# Patient Record
Sex: FEMALE | Race: WHITE
[De-identification: names, ages, dates, MRNs, and addresses within clinical notes are randomized per-mention and may not be internally consistent; named-entity substitution may affect disease eponyms.]

---

## 2019-10-31 ENCOUNTER — HOSPITAL ENCOUNTER (EMERGENCY)
Dept: HOSPITAL 74 - JERFT | Age: 34
Discharge: HOME | End: 2019-10-31
Payer: COMMERCIAL

## 2019-10-31 VITALS — BODY MASS INDEX: 47.3 KG/M2

## 2019-10-31 VITALS — HEART RATE: 108 BPM | DIASTOLIC BLOOD PRESSURE: 77 MMHG | TEMPERATURE: 98.4 F | SYSTOLIC BLOOD PRESSURE: 122 MMHG

## 2019-10-31 DIAGNOSIS — J02.0: Primary | ICD-10-CM

## 2019-10-31 DIAGNOSIS — B95.0: ICD-10-CM

## 2019-10-31 NOTE — PDOC
History of Present Illness





- General


Chief Complaint: Sore Throat


Stated Complaint: SORE THROAT


Time Seen by Provider: 10/31/19 10:19





- History of Present Illness


Initial Comments: 





10/31/19 10:45


CHIEF COMPLAINT: sore throat





HISTORY OF PRESENT ILLNESS: 35 yo F presents to ED with sore throat x 2 days.  

Patient reports that her daughter was diagnosed and treated with strep a few 

days prior to her onset of symptoms so she is concerned that she has strep as 

well.  Denies any change in voice or difficulty swallowing own saliva.  Reports 

subjective fever, denies vomiting or diarrhea. 





No recent travel or sick contacts. 





PAST MEDICAL HISTORY: Denies past medical history





FAMILY HISTORY: Denies





SOCIAL HISTORY: Denies tobacco, alcohol, illicit drug use. 





SURGICAL HISTORY: Denies





ALLERGIES: No known drug allergies





REVIEW OF SYSTEMS


General/Constitutional: Denies fever or chills. Denies weakness, weight change.





HEENT: Sore throat x 2 days. Denies change in vision. Denies ear pain or 

discharge.


Cardiovascular: Denies chest pain or shortness of breath.





Respiratory: Denies cough, wheezing, or hemoptysis.





Gastrointestinal: Denies nausea, vomiting, diarrhea or constipation. Denies 

rectal bleeding.





Genitourinary: Denies dysuria, frequency, or change in urination.





Musculoskeletal: Denies joint or muscle swelling or pain. Denies neck or back 

pain.





Skin and breasts: Denies rash or easy bruising.





Neurologic: Denies headache, vertigo, loss of consciousness, or loss of 

sensation.





Psychiatric: Denies depression or anxiety.











PHYSICAL EXAM


General Appearance: Well-appearing, appropriately dressed.  No apparent distress

, no intoxication.





HEENT: Exudate to L tonsil. Tonsils 1+ b/l.  No peritonsillar abscess 

appreciated.  EOMI, PERRLA, normal voice, TMs normal, pharynx normal.  No 

conjunctival pallor.  No photophobia, scleral icterus.





Neck: Supple.  Trachea midline. No tenderness, rigidity, carotid bruit, stridor

, lymphadenopathy, or thyromegaly. 





Respiratory/Chest: Lungs CTAB.  No shortness of breath, chest tenderness, 

respiratory distress, accessory muscle use. No crackles, rales, rhonchi, stridor

, wheezing, dullness





Cardiovascular: RRR. S1, S2.  No JVD, murmur, bradycardia, tachycardia.





Vascular Pulses: Dorsalis-Pedis (R): 2+, Dorsalis-Pedis (L): 2+





Gastrointestinal/Abdominal: Normal bowel sounds.  Abdomen soft, non-distended.  

No tenderness or rebound tenderness. No  organomegaly, pulsatile mass, guarding

, hernia, hepatomegaly, splenomegaly.





Lymphatic: No adenopathy, tenderness.





Musculoskeletal/Extremities:  Normal inspection. FROM of all extremities, 

normal capillary refill.  Pelvis Stable.  No CVA tenderness. No tenderness to 

extremities, pedal edema, swelling, erythema or deformity.





Integumentary: Appropriate color, dry, warm.  No cyanosis, erythema, jaundice 

or rash





Neurologic: CNs II-XII intact. Fully oriented, alert.  Appropriate mood/affect. 

Motor strength 5/5.  No appreciable EOM palsy, facial droop or sensory deficit.





Past History





- Past Medical History


Allergies/Adverse Reactions: 


 Allergies











Allergy/AdvReac Type Severity Reaction Status Date / Time


 


No Known Allergies Allergy   Verified 10/31/19 09:57











Home Medications: 


Ambulatory Orders





NK [No Known Home Medication]  10/31/19 











- Psycho Social/Smoking Cessation Hx


Smoking History: Never smoked


Have you smoked in the past 12 months: No


Information on smoking cessation initiated: No


Hx Alcohol Use: No


Drug/Substance Use Hx: No





*Physical Exam





- Vital Signs


 Last Vital Signs











Temp Pulse Resp BP Pulse Ox


 


 98.4 F   108 H  20   122/77   98 


 


 10/31/19 09:54  10/31/19 09:54  10/31/19 09:54  10/31/19 09:54  10/31/19 09:54














Medical Decision Making





- Medical Decision Making





10/31/19 10:48


35 yo F presents to ED with sore throat x 2 days. 





-strep swab sent





Strep positive, patient requests bicillin IM instead of po abc





Advised patient follow up with PCP next week.  Advised patient of signs and 

symptoms for return to ED.  Patient verbalized understanding and agrees to plan.








Discharge





- Discharge Information


Problems reviewed: Yes


Clinical Impression/Diagnosis: 


 Strep sore throat





Condition: Stable


Disposition: HOME





- Admission


No





- Follow up/Referral





- Patient Discharge Instructions


Patient Printed Discharge Instructions:  DI for Strep Throat





- Post Discharge Activity


Work/Back to School Note:  Back to Work

## 2020-02-10 ENCOUNTER — HOSPITAL ENCOUNTER (EMERGENCY)
Dept: HOSPITAL 74 - JERFT | Age: 35
Discharge: HOME | End: 2020-02-10
Payer: COMMERCIAL

## 2020-02-10 VITALS — HEART RATE: 97 BPM | DIASTOLIC BLOOD PRESSURE: 61 MMHG | SYSTOLIC BLOOD PRESSURE: 143 MMHG | TEMPERATURE: 98.2 F

## 2020-02-10 VITALS — BODY MASS INDEX: 48.4 KG/M2
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
## 2020-02-10 DIAGNOSIS — M79.672: Primary | ICD-10-CM
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
## 2020-02-10 PROCEDURE — 3E0234Z INTRODUCTION OF SERUM, TOXOID AND VACCINE INTO MUSCLE, PERCUTANEOUS APPROACH: ICD-10-PCS

## 2020-02-10 NOTE — PDOC
History of Present Illness





- General


Chief Complaint: Injury


Stated Complaint: FOOT INJURY


Time Seen by Provider: 02/10/20 11:39


History Source: Patient


Exam Limitations: No Limitations





- History of Present Illness


Is this a multiple visit Asthma Patient?: No





Past History





- Travel


Traveled outside of the country in the last 30 days: No





- Past Medical History


Allergies/Adverse Reactions: 


 Allergies











Allergy/AdvReac Type Severity Reaction Status Date / Time


 


No Known Allergies Allergy   Verified 02/10/20 11:26











Home Medications: 


Ambulatory Orders





Ciprofloxacin HCl [Cipro] 500 mg PO BID 7 Days #14 tablet 02/10/20 








COPD: No


CHF: No


DVT: No


Dementia: No





- Immunization History


Immunization Up to Date: Yes





- Psycho Social/Smoking Cessation Hx


Smoking History: Never smoked


Have you smoked in the past 12 months: No


Information on smoking cessation initiated: No


Hx Alcohol Use: No


Drug/Substance Use Hx: No





**Review of Systems





- Review of Systems


Constitutional: No: Chills, Fever


Musculoskeletal: Yes: Other (left foot discomfort)





*Physical Exam





- Vital Signs


 Last Vital Signs











Temp Pulse Resp BP Pulse Ox


 


 98.2 F   97 H  16   143/61   97 


 


 02/10/20 11:27  02/10/20 11:27  02/10/20 11:27  02/10/20 11:27  02/10/20 11:27














- Physical Exam


General Appearance: Yes: Nourished


Extremity: positive: Normal Capillary Refill, Normal Inspection, Normal Range 

of Motion, Other (no visible puncture wound or laceration noted in left foot, 

distal pulse intact, no erytheam or warmth)


Neurologic: positive: CNs II-XII NML intact, Fully Oriented, Alert, Normal 

Response, Motor Strength 5/5, Abnormal Cranial NS





ED Treatment Course





- RADIOLOGY


Radiology Studies Ordered: 














 Category Date Time Status


 


 FOOT-LEFT [RAD] Stat Radiology  02/10/20 11:40 Completed














Medical Decision Making





- Medical Decision Making





02/10/20 12:11


24 years old female with no prior medical history presents with questionable 

nail puncture to her left foot last night.  Her last tetanus was last was 

greater than 10 years ago.  Patient denies any history of diabetes or any other 

chronic condition.  Patient reports that the nail glazed her foot and no 

evidence of puncture wound.  There is no bleeding is no laceration.  There is 

no warmth swelling.  





02/10/20 13:38


Rx lanny cipro


tetanus updated


s/s of infection discussed with patient





Discharge





- Discharge Information


Problems reviewed: Yes


Clinical Impression/Diagnosis: 


 Foot pain, left





Condition: Stable


Disposition: HOME





- Admission


No





- Additional Discharge Information


Prescriptions: 


Ciprofloxacin HCl [Cipro] 500 mg PO BID 7 Days #14 tablet


Prescription Drug Monitoring Program (I-STOP) results: I-STOP not reviewed





- Follow up/Referral


Referrals: 


Isak Gabriel [Primary Care Provider] - 





- Patient Discharge Instructions


Additional Instructions: 


Your exam in the emergency room was normal today.


There is no evidence of any puncture wound or laceration.


Your tetanus was updated today.


And antibiotics were sent to your pharmacy given questionable nail contact.  


Please watch out for any signs of infection like redness, worsening pain, fever

, chills or discharge.


If that occurs please return to the emergency room immediately for further 

evaluation.








- Post Discharge Activity

## 2020-02-19 ENCOUNTER — HOSPITAL ENCOUNTER (EMERGENCY)
Dept: HOSPITAL 74 - JERFT | Age: 35
Discharge: HOME | End: 2020-02-19
Payer: COMMERCIAL

## 2020-02-19 VITALS — DIASTOLIC BLOOD PRESSURE: 91 MMHG | HEART RATE: 99 BPM | SYSTOLIC BLOOD PRESSURE: 144 MMHG | TEMPERATURE: 98 F

## 2020-02-19 VITALS — BODY MASS INDEX: 49 KG/M2

## 2020-02-19 DIAGNOSIS — K08.89: Primary | ICD-10-CM

## 2020-02-19 NOTE — PDOC
Rapid Medical Evaluation


Time Seen by Provider: 02/19/20 16:58


Medical Evaluation: 


 Allergies











Allergy/AdvReac Type Severity Reaction Status Date / Time


 


No Known Allergies Allergy   Verified 02/10/20 11:26











02/19/20 16:58


I have performed a brief in-person evaluation of this patient.


The patient presents with a chief complaint of: concern for foreign body to 

throat


Pertinent physical exam findings: no foreign body visualized, no resp compromise


I have ordered the following: soft tissue neck XR


The patient will proceed to the ED for further evaluation








**Discharge Disposition





- Diagnosis


 Foreign body (FB) in soft tissue








- Referrals





- Patient Instructions





- Post Discharge Activity

## 2020-02-19 NOTE — PDOC
History of Present Illness





- General


Chief Complaint: Foreign Body (FB)


Stated Complaint: TOOTHACHE


Time Seen by Provider: 02/19/20 16:58


History Source: Patient





- History of Present Illness


Timing/Duration: other (last night)


Severity: moderate


Associated Symptoms: denies: fever/chills





Past History





- Past Medical History


Allergies/Adverse Reactions: 


 Allergies











Allergy/AdvReac Type Severity Reaction Status Date / Time


 


No Known Allergies Allergy   Verified 02/10/20 11:26











Home Medications: 


Ambulatory Orders





Ciprofloxacin HCl [Cipro] 500 mg PO BID 7 Days #14 tablet 02/10/20 








COPD: No


CHF: No


DVT: No


Dementia: No





- Immunization History


Immunization Up to Date: Yes





- Psycho Social/Smoking Cessation Hx


Smoking History: Never smoked


Have you smoked in the past 12 months: No


Hx Alcohol Use: No


Drug/Substance Use Hx: No





**Review of Systems





- Review of Systems


Constitutional: No: Chills, Fever


HEENTM: Yes: Mouth Pain.  No: Mouth Swelling





*Physical Exam





- Vital Signs


 Last Vital Signs











Temp Pulse Resp BP Pulse Ox


 


 98 F   99 H  20   144/91   98 


 


 02/19/20 16:58  02/19/20 16:58  02/19/20 16:58  02/19/20 16:58  02/19/20 16:58














- Physical Exam


General Appearance: Yes: Appropriately Dressed, Mild Distress, Other


HEENT: positive: Normal ENT Inspection, TMs Normal, Pharynx Normal, Other (

minimal ttp to site of R retromolar pad but no swelling or fb seen or palpated, 

dentition unremarkable).  negative: Scleral Icterus (R), Scleral Icterus (L)


Neck: positive: Supple.  negative: Lymphadenopathy (R), Lymphadenopathy (L)


Respiratory/Chest: negative: Respiratory Distress


Integumentary: positive: Dry, Warm


Neurologic: positive: Fully Oriented, Alert, Normal Mood/Affect





Medical Decision Making





- Medical Decision Making





02/19/20 17:18


34-year-old female, no significant history, here with foreign sensation to 

mouth.  Patient states while brushing her teeth last night felt something 

"sticking" in R lower retromolar pad but that when she looked inside her mouth 

did not see any foreign body.  Continues to have discomfort to site.  Well-

appearing and stable with normal oral exam with no gross body visualized or 

palpated.  Will dc to follow-up in urgent care dental clinic today for further 

eval  as discussed with patient





Discharge





- Discharge Information


Problems reviewed: Yes


Clinical Impression/Diagnosis: 


 Mouth disorder





Condition: Good


Disposition: HOME





- Follow up/Referral


Referrals: 


Isak Gabriel [Primary Care Provider] - 





- Patient Discharge Instructions


Additional Instructions: 


Cause of foreign body sensation is unclear as your exam was normal with no 

foreign body seen or palpated.


As symptoms persist, you will need further evaluation by a dentist.  See 

information below:





[facility]


Address: [address] [place] [id number]


Closes 9PM





Phone: [phone]





- Post Discharge Activity